# Patient Record
Sex: MALE | Race: WHITE | NOT HISPANIC OR LATINO | Employment: FULL TIME | URBAN - METROPOLITAN AREA
[De-identification: names, ages, dates, MRNs, and addresses within clinical notes are randomized per-mention and may not be internally consistent; named-entity substitution may affect disease eponyms.]

---

## 2018-05-08 ENCOUNTER — OFFICE VISIT (OUTPATIENT)
Dept: URGENT CARE | Facility: CLINIC | Age: 23
End: 2018-05-08
Payer: OTHER GOVERNMENT

## 2018-05-08 VITALS
HEART RATE: 60 BPM | TEMPERATURE: 96 F | WEIGHT: 131.81 LBS | SYSTOLIC BLOOD PRESSURE: 116 MMHG | OXYGEN SATURATION: 100 % | HEIGHT: 71 IN | RESPIRATION RATE: 16 BRPM | DIASTOLIC BLOOD PRESSURE: 78 MMHG | BODY MASS INDEX: 18.45 KG/M2

## 2018-05-08 DIAGNOSIS — L23.7 POISON IVY: Primary | ICD-10-CM

## 2018-05-08 PROCEDURE — 96372 THER/PROPH/DIAG INJ SC/IM: CPT | Mod: PBBFAC,PO

## 2018-05-08 PROCEDURE — 99203 OFFICE O/P NEW LOW 30 MIN: CPT | Mod: PBBFAC,PO | Performed by: NURSE PRACTITIONER

## 2018-05-08 PROCEDURE — 99214 OFFICE O/P EST MOD 30 MIN: CPT | Mod: S$PBB,,, | Performed by: NURSE PRACTITIONER

## 2018-05-08 PROCEDURE — 99999 PR PBB SHADOW E&M-NEW PATIENT-LVL III: CPT | Mod: PBBFAC,,, | Performed by: NURSE PRACTITIONER

## 2018-05-08 RX ORDER — HYDROXYZINE PAMOATE 50 MG/1
50 CAPSULE ORAL EVERY 6 HOURS PRN
Qty: 30 CAPSULE | Refills: 0 | Status: SHIPPED | OUTPATIENT
Start: 2018-05-08

## 2018-05-08 RX ORDER — METHYLPREDNISOLONE 4 MG/1
TABLET ORAL
Qty: 1 PACKAGE | Refills: 0 | Status: SHIPPED | OUTPATIENT
Start: 2018-05-08

## 2018-05-08 RX ORDER — METHYLPREDNISOLONE ACETATE 80 MG/ML
80 INJECTION, SUSPENSION INTRA-ARTICULAR; INTRALESIONAL; INTRAMUSCULAR; SOFT TISSUE ONCE
Status: COMPLETED | OUTPATIENT
Start: 2018-05-08 | End: 2018-05-08

## 2018-05-08 RX ADMIN — METHYLPREDNISOLONE ACETATE 80 MG: 80 INJECTION, SUSPENSION INTRALESIONAL; INTRAMUSCULAR; INTRASYNOVIAL; SOFT TISSUE at 06:05

## 2018-05-08 NOTE — PROGRESS NOTES
"Subjective:       Patient ID: Braydon Mckenzie is a 22 y.o. male.    Chief Complaint: Poison Barbara    HPI  Braydon presents with an itchy rash after crawling under a trailer. He thinks he was exposed to poison ivy. The rash is extremely pruritic. He has tried aloe creams and did take Benadryl last night with minimal relief. The exposure was two days ago. The rash is scattered over the torso and arms. The rash is worsening.   /78 (BP Location: Left arm, Patient Position: Sitting, BP Method: Small (Manual))   Pulse 60   Temp 96.1 °F (35.6 °C) (Tympanic)   Resp 16   Ht 5' 11" (1.803 m)   Wt 59.8 kg (131 lb 13.4 oz)   SpO2 100%   BMI 18.39 kg/m²     Review of Systems   Constitutional: Negative for chills, diaphoresis and fever.   HENT: Negative for congestion and sore throat.    Respiratory: Negative for shortness of breath.    Gastrointestinal: Negative for nausea and vomiting.   Skin: Positive for rash. Negative for wound.   Allergic/Immunologic: Negative for immunocompromised state.   Hematological: Does not bruise/bleed easily.   Psychiatric/Behavioral: Negative for behavioral problems and confusion.       Objective:      Physical Exam   Constitutional: He is oriented to person, place, and time. He appears well-developed and well-nourished.   Eyes: Conjunctivae are normal.   Neck: Normal range of motion.   Cardiovascular: Normal rate.    Pulmonary/Chest: Effort normal. No respiratory distress.   Musculoskeletal: Normal range of motion.   Neurological: He is alert and oriented to person, place, and time.   Skin: Skin is warm and dry. Rash noted. Rash is vesicular.   Vesicular rash on an erythematous base, Pattern is linear. Rash is on arms, hands torso, neck.  No drainage or signs of infection.     Psychiatric: He has a normal mood and affect. His behavior is normal. Thought content normal.   Vitals reviewed.      Assessment:       1. Poison ivy        Plan:       Braydon was seen today for poison " ivy.    Diagnoses and all orders for this visit:    Poison ivy  -     methylPREDNISolone acetate injection 80 mg; Inject 1 mL (80 mg total) into the muscle once.  -     methylPREDNISolone (MEDROL DOSEPACK) 4 mg tablet; use as directed Start tomorrow  -     hydrOXYzine pamoate (VISTARIL) 50 MG Cap; Take 1 capsule (50 mg total) by mouth every 6 (six) hours as needed.       Take/Use steroid as prescribed  Wash all clothing and linen that have come into contact with oil.  May use Calamine lotion for itching  Take antihistamine such as Benadryl or Hydroxyzine as prescribed for itching  May take Claritin or Zyrtec during the day if Benadryl causes daytime drowsiness  Use cool compresses to decrease itching and inflammation.    Avoid heat exposure or warm baths.  Watch for signs of infection and report to PCP  if this occurs.    If symptoms worsen or fail to improve with treatment, see a dermatologist or go to the nearest Emergency Room.

## 2018-05-08 NOTE — PATIENT INSTRUCTIONS
Managing a Poison Ivy, Poison Oak, or Poison Sumac Reaction  If you come in contact with urushiol    If you think you may have come in contact with the sap oil (called urushiol) contained in poison ivy, poison oak, or poison sumac plants, wash the affected part of your skin. Do this within 15 minutes after contact. Use water or preferably, soap and water.  Undress, and wash your clothes and gear as soon as you can. Be sure to wash any pet that was with you. Taking these steps can help prevent spreading sap oil to someone else. If you have a rash, but are not sure if it is from one of these plants, see your healthcare provider.  To soothe the itching  Your skin may react to poison oak, poison ivy, and poison sumac within hours to a few days after contact. Once you have come into contact with these plants, you cant stop the reaction. But you can take these steps to soothe the itching:  · Dont scratch or scrub your rash. Not even if the itching is severe. Scratching can lead to infection.  · Bathe in lukewarm (not hot) water. Or take short cool showers to relieve the itching. For a more soothing bath, add oatmeal to the water.  · Use antihistamines that are taken by mouth. These include diphenhydramine. You can buy these at the pharmacy. Talk to your healthcare provider or pharmacist for more information on oral antihistamines.  · Use over-the-counter treatments on your skin. These include cortisone and calamine lotion.  How your skin may react  A mild rash may become red, swollen, and itchy. The rash may form a line on your skin where you brushed against the plant. If you have a severe rash, your itching may worsen. And your rash may blister and ooze. If this happens, seek medical care. The fluid from your blisters will not make your rash spread. With or without medical care, your rash may last up to 3 weeks. In the future, try to avoid coming in contact with these plants.  When to call your healthcare  provider  Call your healthcare provider if:  · Your rash is severe  · The rash spreads beyond the exposed part of your body or affects your face.  · The rash does not clear up within a few weeks  You may be given medicine to take by mouth or apply directly on the skin.  Call 911  Call 911 if you have any of the following:  · Trouble breathing or swallowing  · Any significant swelling  Date Last Reviewed: 3/1/2017  © 2490-3450 [employer]. [address] [place] [address], [place] [address]. All rights reserved. This information is not intended as a substitute for professional medical care. Always follow your healthcare professional's instructions.

## 2022-01-21 ENCOUNTER — OCCUPATIONAL HEALTH (OUTPATIENT)
Dept: URGENT CARE | Facility: CLINIC | Age: 27
End: 2022-01-21

## 2022-01-21 DIAGNOSIS — Z02.83 ENCOUNTER FOR DRUG SCREENING: Primary | ICD-10-CM

## 2022-01-21 PROCEDURE — 80305 DRUG TEST PRSMV DIR OPT OBS: CPT | Mod: S$GLB,,, | Performed by: PREVENTIVE MEDICINE

## 2022-01-21 PROCEDURE — 80305 OOH NON-DOT DRUG SCREEN: ICD-10-PCS | Mod: S$GLB,,, | Performed by: PREVENTIVE MEDICINE

## 2022-02-24 ENCOUNTER — HOSPITAL ENCOUNTER (EMERGENCY)
Facility: HOSPITAL | Age: 27
Discharge: HOME | End: 2022-02-24
Attending: EMERGENCY MEDICINE

## 2022-02-24 VITALS
SYSTOLIC BLOOD PRESSURE: 109 MMHG | HEIGHT: 72 IN | BODY MASS INDEX: 17.88 KG/M2 | RESPIRATION RATE: 18 BRPM | WEIGHT: 132 LBS | TEMPERATURE: 98 F | OXYGEN SATURATION: 99 % | DIASTOLIC BLOOD PRESSURE: 53 MMHG | HEART RATE: 72 BPM

## 2022-02-24 DIAGNOSIS — T15.91XA FOREIGN BODY OF RIGHT EYE, INITIAL ENCOUNTER: Primary | ICD-10-CM

## 2022-02-24 DIAGNOSIS — H18.891 CORNEAL RUST RING OF RIGHT EYE: ICD-10-CM

## 2022-02-24 PROCEDURE — 25000003 PHARM REV CODE 250: Performed by: NURSE PRACTITIONER

## 2022-02-24 PROCEDURE — 99284 EMERGENCY DEPT VISIT MOD MDM: CPT | Mod: 25

## 2022-02-24 PROCEDURE — 65222 REMOVE FOREIGN BODY FROM EYE: CPT

## 2022-02-24 RX ORDER — HYDROCODONE BITARTRATE AND ACETAMINOPHEN 5; 325 MG/1; MG/1
1 TABLET ORAL
Status: COMPLETED | OUTPATIENT
Start: 2022-02-24 | End: 2022-02-24

## 2022-02-24 RX ORDER — MOXIFLOXACIN 5 MG/ML
1 SOLUTION/ DROPS OPHTHALMIC 3 TIMES DAILY
Qty: 3 ML | Refills: 0 | Status: SHIPPED | OUTPATIENT
Start: 2022-02-24

## 2022-02-24 RX ORDER — HYDROCODONE BITARTRATE AND ACETAMINOPHEN 5; 325 MG/1; MG/1
1 TABLET ORAL EVERY 6 HOURS PRN
Qty: 12 TABLET | Refills: 0 | Status: SHIPPED | OUTPATIENT
Start: 2022-02-24

## 2022-02-24 RX ORDER — MOXIFLOXACIN 5 MG/ML
1 SOLUTION/ DROPS OPHTHALMIC
Status: COMPLETED | OUTPATIENT
Start: 2022-02-24 | End: 2022-02-24

## 2022-02-24 RX ORDER — TETRACAINE HYDROCHLORIDE 5 MG/ML
2 SOLUTION OPHTHALMIC
Status: COMPLETED | OUTPATIENT
Start: 2022-02-24 | End: 2022-02-24

## 2022-02-24 RX ADMIN — FLUORESCEIN SODIUM 1 EACH: 1 STRIP OPHTHALMIC at 07:02

## 2022-02-24 RX ADMIN — TETRACAINE HYDROCHLORIDE 2 DROP: 5 SOLUTION OPHTHALMIC at 07:02

## 2022-02-24 RX ADMIN — HYDROCODONE BITARTRATE AND ACETAMINOPHEN 1 TABLET: 5; 325 TABLET ORAL at 07:02

## 2022-02-24 RX ADMIN — MOXIFLOXACIN 1 DROP: 5 SOLUTION/ DROPS OPHTHALMIC at 08:02

## 2022-02-24 NOTE — ED PROVIDER NOTES
Encounter Date: 2/24/2022    SCRIBE #1 NOTE: I, Juan Sosa, am scribing for, and in the presence of,  LUIS Francisco. I have scribed the following portions of the note - Other sections scribed: Procedures.       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 Patient reports blurred vision, and pain to right eye.      CC:  Foreign body right eye    HPI: Braydon Mckenzie, a 26 y.o. male presents to the ED with a foreign body in the right eye.  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  It is painful.  Reports associated blurred vision to the right eye.  Reports no left eye symptoms.  He was wearing eye protection.  Last tetanus 2020. He is not were contacts or glasses.      The history is provided by the patient. No  was used.     Review of patient's allergies indicates:   Allergen Reactions    Poison ivy extract Rash     No past medical history on file.  No past surgical history on file.  No family history on file.  Social History     Tobacco Use    Smoking status: Current Every Day Smoker    Smokeless tobacco: Never Used   Substance Use Topics    Alcohol use: No    Drug use: No     Review of Systems   Constitutional: Negative for fever.   HENT: Negative for congestion, trouble swallowing and voice change.    Eyes: Positive for pain, redness and visual disturbance.        (+) FB Right Eye   Respiratory: Negative for cough.    Skin: Negative for color change, pallor, rash and wound.   Neurological: Negative for seizures and syncope.   Psychiatric/Behavioral: Negative for confusion.       Physical Exam     Initial Vitals [02/24/22 0712]   BP Pulse Resp Temp SpO2   121/68 72 16 98.4 °F (36.9 °C) 100 %      MAP       --         Physical Exam    Nursing note and vitals reviewed.  Constitutional: He appears well-developed and well-nourished. He is not  diaphoretic. He is cooperative.  Non-toxic appearance. He does not have a sickly appearance. He does not appear ill. No distress.   HENT:   Head: Normocephalic and atraumatic.   Right Ear: External ear normal.   Left Ear: External ear normal.   Nose: Nose normal.   Mouth/Throat: No trismus in the jaw.   Eyes: EOM are normal. Pupils are equal, round, and reactive to light. Foreign body present in the right eye. No foreign body present in the left eye. Right conjunctiva is injected. Left conjunctiva is not injected. No scleral icterus.       Neck: Phonation normal.   Normal range of motion.  Pulmonary/Chest: No tachypnea and no bradypnea. No respiratory distress.   Abdominal: He exhibits no distension.   Musculoskeletal:         General: Normal range of motion.      Cervical back: Normal range of motion. No rigidity. Normal range of motion.     Neurological: He is alert and oriented to person, place, and time. No sensory deficit. He exhibits normal muscle tone. Coordination and gait normal. GCS score is 15. GCS eye subscore is 4. GCS verbal subscore is 5. GCS motor subscore is 6.   Skin: Skin is warm and dry. Capillary refill takes less than 2 seconds. No bruising and no rash noted. No erythema.   Psychiatric: He has a normal mood and affect. His behavior is normal. Judgment and thought content normal.         ED Course   Foreign Body    Date/Time: 2/24/2022 8:25 AM  Performed by: LUIS Lux  Authorized by: Jhoan Shi MD   Consent Done: Yes  Consent: Verbal consent obtained.  Consent given by: patient  Patient understanding: patient states understanding of the procedure being performed  Patient identity confirmed: verbally with patient  Body area: eye  Location details: right cornea    Anesthesia:  Local Anesthetic: tetracaine drops    Patient sedated: no  Patient restrained: no  Patient cooperative: yes  Localization method: visualized and slit lamp  Removal mechanism: ophthalmic jonathon and moist cotton  swab  Eye examined with fluorescein.  No fluorescein uptake.  Residual rust ring present.  Residual rust ring removed.  Dressing: antibiotic drops  Depth: embedded  Complexity: simple  1 objects recovered.  Objects recovered: black FB with rust ring removed  Post-procedure assessment: foreign body removed  Patient tolerance: Patient tolerated the procedure well with no immediate complications      Labs Reviewed - No data to display       Imaging Results    None          Medications   TETRAcaine HCl (PF) 0.5 % Drop 2 drop (2 drops Both Eyes Given by Other 2/24/22 0745)   fluorescein ophthalmic strip 1 each (1 each Both Eyes Given by Other 2/24/22 0745)   HYDROcodone-acetaminophen 5-325 mg per tablet 1 tablet (1 tablet Oral Given 2/24/22 0745)   moxifloxacin 0.5 % ophthalmic solution 1 drop (1 drop Right Eye Given 2/24/22 0824)           APC / Resident Notes:   This is an evaluation of a 26 y.o. male that presents to the Emergency Department for foreign body to right eye. Physical Exam shows a non-toxic, afebrile, and well appearing male. PERRL. EOM's intact and without pain.  Visual Acuity decrease in the right eye when compared to the left. There is no proptosis, scleral icterus , erythema or increased warmth in periorbital area. There is conjunctival injection of the right eye with a FB visible. No findings of open globe injury. There are no dendritic lesions, facial rash and a negative Wade's sign. After instillation topical anesthetic, pain improved. Vital Signs Are Reassuring. If available, previous records reviewed.  Procedures:  A portion the foreign bodies removed with moistened cotton swab.  There remained a rust ring was removed with an ophthalmic jonathon by myself and Dr. Shi.    My overall impression is foreign body of right eye with corneal rust ring. I considered, but at this time, do not suspect conjunctivitis, uveitis/iritis, acute angle closure glaucoma, orbital or preseptal cellulitis,  herpetic involvement, open globe injury.     D/C Meds:  Vigamox, hydrocodone. D/C Information:  Ophthalmology follow-up. The diagnosis, treatment plan, instructions for follow-up and reevaluation with Ophthalmology as well as ED return precautions were discussed and understanding was verbalized. All questions or concerns have been addressed. This case was discussed with and the patient has been examined by Dr. Shi who is in agreement with my assessment and plan.  ANSHU Cordero FNP-C     Scribe Attestation:   Scribe #1: I performed the above scribed service and the documentation accurately describes the services I performed. I attest to the accuracy of the note.                 Clinical Impression:   Final diagnoses:  [H18.891] Corneal rust ring of right eye  [T15.91XA] Foreign body of right eye, initial encounter (Primary)          ED Disposition Condition    Discharge Stable        ED Prescriptions     Medication Sig Dispense Start Date End Date Auth. Provider    moxifloxacin (VIGAMOX) 0.5 % ophthalmic solution Place 1 drop into the right eye 3 (three) times daily. 3 mL 2/24/2022  LUIS Lux    HYDROcodone-acetaminophen (NORCO) 5-325 mg per tablet Take 1 tablet by mouth every 6 (six) hours as needed for Pain. 12 tablet 2/24/2022  LUIS Lux        Follow-up Information     Follow up With Specialties Details Why Contact Info [facility] Triage [facility]  Call in 1 day To schedule appointment for tomorrow Please call the triage line on the next business day to schedule an appointment with the eye doctor.   [phone]    [facility] Emergency Dept Emergency Medicine Go to  If symptoms worsen [address]aryn Leos  [facility] [address] [phone]       IDEEPIKA APRN, SOL, personally performed the services described in this documentation. All medical record entries made by the scribe were at my direction and in my presence. I have reviewed the chart and agree that  the record reflects my personal performance and is accurate and complete.       Albaro Guevara, [facility]  02/24/22 0839

## 2022-02-24 NOTE — DISCHARGE INSTRUCTIONS
§ Please return to the Emergency Department for any new or worsening symptoms including: fever, chest pain, shortness of breath, loss of consciousness, dizziness, weakness, or any other concerns.     § Schedule an appointment for follow up with Opthalmology as soon as possible for a recheck of your symptoms. If you do not have one, contact the one listed on your discharge paperwork or call the [facility] Appointment Desk at [phone] to schedule an appointment.     § If you require follow up care from a specialist and are unable to schedule an appointment with them directly, please contact your Primary Care Provider on the next business day to set up a referral.      § Please take all medication as prescribed. You have been prescribed Norco (Hydrocodone) for pain. Please do not take this medication while working, drinking alcohol, swimming, or while driving/operating heavy machinery. This medication may cause drowsiness, dizziness, impair judgment, and reduce physical capabilities.You should not drive, operate heavy machinery, or make life changing decisions while taking this medication.  This medication contains Tylenol. Please do not take any additional Tylenol while you are taking this medication.

## 2022-02-24 NOTE — ED NOTES
Pt reports debri/ foreign body to right eye while working yesterday. Redness and pain noted to right eye. Minor swelling noted. Pt reports rubbing eyes. Pt also reports blurred vision to eye. Pt AAOx 4. VSS. Will continue to monitor.

## 2024-12-10 ENCOUNTER — OFFICE VISIT (OUTPATIENT)
Dept: URGENT CARE | Facility: CLINIC | Age: 29
End: 2024-12-10

## 2024-12-10 VITALS
SYSTOLIC BLOOD PRESSURE: 118 MMHG | TEMPERATURE: 98 F | OXYGEN SATURATION: 98 % | BODY MASS INDEX: 20.32 KG/M2 | HEART RATE: 103 BPM | HEIGHT: 72 IN | DIASTOLIC BLOOD PRESSURE: 80 MMHG | RESPIRATION RATE: 18 BRPM | WEIGHT: 150 LBS

## 2024-12-10 DIAGNOSIS — Z20.828 EXPOSURE TO INFLUENZA: Primary | ICD-10-CM

## 2024-12-10 PROCEDURE — 99214 OFFICE O/P EST MOD 30 MIN: CPT | Mod: TIER,S$GLB,,

## 2024-12-10 RX ORDER — OSELTAMIVIR PHOSPHATE 75 MG/1
75 CAPSULE ORAL 2 TIMES DAILY
Qty: 10 CAPSULE | Refills: 0 | Status: SHIPPED | OUTPATIENT
Start: 2024-12-10 | End: 2024-12-15

## 2024-12-10 NOTE — LETTER
December 10, 2024      [facility] [place]  [address], [address]  [place] [address]  Phone: [phone]  Fax: [phone]       Patient: Braydon Mckenzie   YOB: 1995  Date of Visit: 12/10/2024    To Whom It May Concern:    Cal Mckenzie  was at [facility] on 12/10/2024. The patient may return to work/school on 12/13/2024.  If you have any questions or concerns, or if I can be of further assistance, please do not hesitate to contact me.    Sincerely,    Marva Paulino MA

## 2024-12-10 NOTE — PROGRESS NOTES
Subjective:      Patient ID: Braydon Mckenzie is a 29 y.o. male.    Vitals:  height is 6' (1.829 m) and weight is 68 kg (150 lb). His oral temperature is 98.1 °F (36.7 °C). His blood pressure is 118/80 and his pulse is 103. His respiration is 18 and oxygen saturation is 98%.     Chief Complaint: Cough    30 y/o male presents to clinic with sinus problem, fever around 2-3 days ago. He has been exposed to his daughter with Flu. He has been running fever, having body aches, headaches and a runny nose. Has taken Dayquil, Nyquil, cough drops, Robitssuin, Motrin but it only subsides for a little bit.     Cough  This is a new problem. The current episode started in the past 7 days. The problem has been gradually worsening. The cough is Non-productive. Associated symptoms include a fever, headaches, rhinorrhea and a sore throat. Associated symptoms comments: Congestion. Treatments tried: Dayquil, Nyquil, cough drops, Robitssuin, Motrin. The treatment provided no relief. There is no history of asthma.       Constitution: Positive for fever.   HENT:  Positive for sore throat.    Respiratory:  Positive for cough.    Neurological:  Positive for headaches.      Objective:     Physical Exam   Constitutional:  Non-toxic appearance. He does not appear ill. No distress.   HENT:   Head: Normocephalic and atraumatic.   Ears:   Right Ear: Tympanic membrane, external ear and ear canal normal.   Left Ear: Tympanic membrane, external ear and ear canal normal.   Nose: No rhinorrhea or congestion.   Mouth/Throat: Uvula is midline. Mucous membranes are moist. Posterior oropharyngeal erythema present. No posterior oropharyngeal edema. Oropharynx is clear.   Eyes: Conjunctivae are normal.   Neck: Neck supple. No neck rigidity present.   Cardiovascular: Normal rate and regular rhythm.   Pulmonary/Chest: Effort normal. No respiratory distress. He has no wheezes.   Abdominal: Normal appearance.   Neurological: no focal deficit. He is alert.    Skin: Skin is warm, dry and not diaphoretic.   Psychiatric: His behavior is normal. Judgment and thought content normal.   Nursing note and vitals reviewed.      Assessment:     1. Exposure to influenza        Plan:       Exposure to influenza  -     Cancel: POCT Influenza A/B MOLECULAR  -     oseltamivir (TAMIFLU) 75 MG capsule; Take 1 capsule (75 mg total) by mouth 2 (two) times daily. for 5 days  Dispense: 10 capsule; Refill: 0        VS stable, afebrile. Pt declined flu testing due to no insurance.    Discussed with patient the importance of f/u with their primary care provider. Urged to go to the ER for any worsening signs or symptoms.   Please drink plenty of fluids.  Please get plenty of rest.  Please return here or go to the Emergency Department for any concerns or worsening of condition.  If you do not have Hypertension or any history of palpitations, it is ok to take over the counter Sudafed or Mucinex D or Allegra-D or Claritin-D or Zyrtec-D.  If you do take one of the above, it is ok to combine that with plain over the counter Mucinex or Allegra or Claritin or Zyrtec.  If for example you are taking Zyrtec -D, you can combine that with Mucinex, but not Mucinex-D.  If you are taking Mucinex-D, you can combine that with plain Allegra or Claritin or Zyrtec.   If you do have Hypertension or palpitations, it is safe to take Coricidin HBP for relief of sinus symptoms.  If not allergic, please take over the counter Tylenol (Acetaminophen) and/or Motrin (Ibuprofen) as directed for control of pain and/or fever.  Please follow up with your primary care doctor or specialist as needed.    If you  smoke, please stop smoking.     Discussed with patient the importance of f/u with their primary care provider. Urged to go to the ER for any worsening signs or symptoms.     Patient Instructions   1.  Take all medications as directed. If you have been prescribed antibiotics, make sure to complete them.   2.  Rest and keep  yourself/patient well hydrated. For adults, it is recommended to drink at least 8-10 glasses of water daily.   3.  For patients above 6 months of age who are not allergic to and are not on anticoagulants, you can alternate Tylenol and Motrin every 4-6 hours for fever above 100.4F and/or pain.  For patients less than 6 months of age, allergic to or intolerant to NSAIDS, have gastritis, gastric ulcers, or history of GI bleeds, are pregnant, or are on anticoagulant therapy, you can take Tylenol every 4 hours as needed for fever above 100.4F and/or pain.   4. You should schedule a follow-up appointment with your Primary Care Provider/Pediatrician for recheck in 2-3 days or as directed at this visit.   5.  If your condition fails to improve in a timely manner, you should receive another evaluation by your Primary Care Provider/Pediatrician to discuss your concerns or return to urgent care for a recheck.  If your condition worsens at any time, you should report immediately to your nearest Emergency Department for further evaluation. **You must understand that you have received Urgent Care treatment only and that you may be released before all of your medical problems are known or treated. You, the patient, are responsible to arrange for follow-up care as instructed.